# Patient Record
Sex: FEMALE | Race: WHITE
[De-identification: names, ages, dates, MRNs, and addresses within clinical notes are randomized per-mention and may not be internally consistent; named-entity substitution may affect disease eponyms.]

---

## 2021-01-26 ENCOUNTER — HOSPITAL ENCOUNTER (EMERGENCY)
Dept: HOSPITAL 35 - ER | Age: 71
Discharge: HOME | End: 2021-01-26
Payer: COMMERCIAL

## 2021-01-26 VITALS — SYSTOLIC BLOOD PRESSURE: 149 MMHG | DIASTOLIC BLOOD PRESSURE: 73 MMHG

## 2021-01-26 VITALS — BODY MASS INDEX: 26.52 KG/M2 | HEIGHT: 66 IN | WEIGHT: 164.99 LBS

## 2021-01-26 DIAGNOSIS — M25.512: Primary | ICD-10-CM

## 2021-01-26 DIAGNOSIS — Z88.0: ICD-10-CM

## 2021-01-26 DIAGNOSIS — G40.909: ICD-10-CM

## 2021-01-26 DIAGNOSIS — Z79.899: ICD-10-CM

## 2021-01-26 LAB
ANION GAP SERPL CALC-SCNC: 7 MMOL/L (ref 7–16)
BASOPHILS NFR BLD AUTO: 0.4 % (ref 0–2)
BUN SERPL-MCNC: 8 MG/DL (ref 7–18)
CALCIUM SERPL-MCNC: 10.5 MG/DL (ref 8.5–10.1)
CHLORIDE SERPL-SCNC: 101 MMOL/L (ref 98–107)
CO2 SERPL-SCNC: 30 MMOL/L (ref 21–32)
CREAT SERPL-MCNC: 0.8 MG/DL (ref 0.6–1)
EOSINOPHIL NFR BLD: 0.1 % (ref 0–3)
ERYTHROCYTE [DISTWIDTH] IN BLOOD BY AUTOMATED COUNT: 13.4 % (ref 10.5–14.5)
GLUCOSE SERPL-MCNC: 93 MG/DL (ref 74–106)
GRANULOCYTES NFR BLD MANUAL: 57.5 % (ref 36–66)
HCT VFR BLD CALC: 45.6 % (ref 37–47)
HGB BLD-MCNC: 15.4 GM/DL (ref 12–15)
LYMPHOCYTES NFR BLD AUTO: 33.7 % (ref 24–44)
MCH RBC QN AUTO: 29.3 PG (ref 26–34)
MCHC RBC AUTO-ENTMCNC: 33.8 G/DL (ref 28–37)
MCV RBC: 86.8 FL (ref 80–100)
MONOCYTES NFR BLD: 8.3 % (ref 1–8)
NEUTROPHILS # BLD: 2.1 THOU/UL (ref 1.4–8.2)
PLATELET # BLD: 223 THOU/UL (ref 150–400)
POTASSIUM SERPL-SCNC: 3.7 MMOL/L (ref 3.5–5.1)
RBC # BLD AUTO: 5.26 MIL/UL (ref 4.2–5)
SODIUM SERPL-SCNC: 138 MMOL/L (ref 136–145)
TROPONIN I SERPL-MCNC: <0.06 NG/ML (ref ?–0.06)
WBC # BLD AUTO: 3.7 THOU/UL (ref 4–11)

## 2021-01-27 NOTE — EKG
[facility] [employer]
[place]  [address]
Phone:  [phone]                    ELECTROCARDIOGRAM REPORT      
_______________________________________________________________________________
 
Name:       ALEJANDRO TAFOYA               Room #:                     [facility]#:      [id number]     Account #:      [id number]  
Admission:  21    Attend Phys:                          
Discharge:  21    Date of Birth:  [birth year]  
                                                          Report #: [id number]
   [id number]
_______________________________________________________________________________
                         [facility] ED
                                       
Test Date:    2021               Test Time:    15:37:49
Pat Name:     ALEJANDRO TAFOYA            Department:   
Patient ID:   [id number]            Room:          
Gender:       F                        Technician:   jenifer
:          1950               Requested By: Melanie Mcleod
Order Number: [id number] MD:   Dennys Perales
                                 Measurements
Intervals                              Axis          
Rate:         69                       P:            50
NH:           178                      QRS:          -36
QRSD:         93                       T:            21
QT:           384                                    
QTc:          412                                    
                           Interpretive Statements
Sinus rhythm
Low voltage, precordial leads
Probable left ventricular hypertrophy
Anterior Q waves, possibly due to LVH
Baseline wander in lead(s) V3
No previous ECG available for comparison
Electronically Signed On 2021 7:19:07 CST by Dennys Perales
[email or web address]
 
 
 
 
 
 
 
 
 
 
 
 
 
 
 
 
 
 
 
  <ELECTRONICALLY SIGNED>
   By: Dennys Perales MD, [facility]    
  21     0719
D: 21 1537                           _____________________________________
T: 21                           Dennys Perales MD, FACC      /EPI